# Patient Record
(demographics unavailable — no encounter records)

---

## 2017-11-17 NOTE — REP
CHEST, TWO VIEWS:

 

FINDINGS:

 

There is thickening of perihilar markings with peribronchial cuffing, suggesting

a viral etiology or reactive airway disease.  No consolidating infiltrate is

seen.  The heart is normal in size. The mediastinal silhouette is unremarkable.

The visualized osseous structures are intact.

 

IMPRESSION:

 

Findings compatible with viral pneumonitis or reactive airway disease. No

consolidating infiltrate.

 

 

 

 

 

 

Signed by

Sean Spencer MD 11/17/2017 08:11 P